# Patient Record
Sex: MALE | Race: WHITE | NOT HISPANIC OR LATINO | Employment: STUDENT | URBAN - METROPOLITAN AREA
[De-identification: names, ages, dates, MRNs, and addresses within clinical notes are randomized per-mention and may not be internally consistent; named-entity substitution may affect disease eponyms.]

---
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
## 2024-02-26 NOTE — PROGRESS NOTES
Patient ID: Negro Pfeiffer is a 18 y.o. male who presents for the evaluation of ***. They present as a referral from ***.  Patient is accompanied to the visit today by ***.    PROVIDER IMPRESSIONS:  DIAGNOSES/PROBLEMS:  -    ASSESSMENT:   Negro Pfeiffer is a pleasant 18 y.o. male who presents with symptoms of ***.   Based on the clinical information provided, symptoms and clinical exam findings are consistent with ***.   Reassurance provided to patient that otologic exam today revealed no evidence of acute infection/inflammation in the external auditory canal (EAC) bilaterally and that tympanic membrane (TM) appears with no evidence of infection, effusion, retraction or perforation bilaterally.  Audiogram reviewed in detail with the patient, which revealed ***.     PLAN:  ***  ***  Follow-up: Patient may schedule for follow-up in {Time; 1 week to 1 year:83124}. They may follow-up sooner, if needed. Patient is agreeable to this plan, all questions were answered to patient's satisfaction.     Subjective   HPI: Negro Pfeiffer is a 18 y.o. male who presents for the evaluation of ***.  The patient states that symptom onset began ***.   When asked about the presence of hearing loss, ear pain, ear fullness/pressure, tinnitus, ear itching, ear drainage, autophony, dizziness or vertigo, he admits to ***.   When asked about pertinent otologic history, the patient {DENIES/REPORTS EC:97818} a history of recurrent ear infections, {DENIES/REPORTS EC:91135} history of ear surgery, {DENIES/REPORTS EC:89655} history of PE tube insertion, and {DENIES/REPORTS EC:63237} history of prolonged/traumatic loud noise exposure.   The patient {DOES_DOES NOT:78688} insert Q-tips in the ear canals, and  {DENIES/REPORTS EC:84161} insertion of other foreign objects into the ear canals. The patient {DENIES/REPORTS EC:96303} history of wearing hearing aid devices.   The patient {DOES_DOES NOT:38350} endorse a family history of hearing loss.       PATIENT  HISTORY:  No past medical history on file.   No past surgical history on file.   No Known Allergies     Current Outpatient Medications:     predniSONE (Deltasone) 20 mg tablet, Take 3 tabs (60mg) daily for 3 days, then take 2 tabs (40mg) daily for 3 days, then take 1 tab (20mg) daily for 3 days., Disp: 18 tablet, Rfl: 0   Tobacco Use: Low Risk  (11/7/2023)    Patient History     Smoking Tobacco Use: Never     Smokeless Tobacco Use: Never     Passive Exposure: Not on file      Alcohol Use: Not on file      Social History     Substance and Sexual Activity   Drug Use Not Currently        Review of Systems   All other systems negative.     Objective   Visit Vitals  Smoking Status Never        PHYSICAL EXAM:  General appearance: Appears well, well-nourished, well groomed. No acute distress.   Constitutional: No fever, chills, weight loss or weight gain.  Communication: Normal communication  Psychiatric: Oriented to person, place and time. Normal mood and affect.  Neurologic: Cranial nerves II-XII grossly intact and symmetric bilaterally.  Cardiovascular: Examination of peripheral vascular system shows no clubbing or cyanosis.  Respiratory: No respiratory distress increased work of breathing. Inspection of the chest with symmetric chest expansion and normal respiratory effort.  Skin: No head and neck rashes.  Head: Normocephalic. Atraumatic with no masses, lesions or scarring.  Face: Normal symmetry. No scars or deformities.  Eyes: Conjunctiva not edematous or erythematous. PERRLA  Neck: Supple and symmetric, trachea midline. Lymph nodes with no adenopathy.  Head: Normocephalic. Atraumatic with no masses, lesions or scarring.  Eyes: PERRL, EOMI, Conjunctiva is clear. No nystagmus.  Nose: External inspection of nose: No nasal lesions, lacerations or scars. No tenderness on frontal or maxillary sinus palpation.  Anterior rhinoscopy with limited visualization past the inferior turbinates: Septum is [].  No septal perforation  or lesions. No septal hematoma/ seroma.  No signs of bleeding.  No evidence of intranasal polyps.  Inferior turbinates are [].    Throat:  Floor of mouth is clear, no masses.  Tongue appears normal, no lesions or masses. Gums, gingiva, buccal mucosa appear pink and moist, no lesions. Teeth are in intact.  No obvious dental infections.  Peritonsillar regions appear symmetric without swelling.  Hard and soft palate appear normal, no obvious cleft. Uvula is midline.  Left Tonsil -- [], no exudates.  Right Tonsil -- [], no exudates.  Oropharynx: No lesions. Retropharyngeal wall is flat.  []postnasal drip.  Salivary Glands: Symmetric bilaterally.  No palpable masses.  No evidence of acute infection or salivary stones.  TMJ: Normal, no trismus.  Right Ear: External inspection of ear with no deformity, scars, or masses. Mastoid is nontender.   External auditory canal is clear.    TM is intact with no sign of infection, effusion, or retraction.  No perforation seen.   Auto insufflation visible under microscopy.  Left Ear: External inspection of ear with no deformity, scars, or masses. Mastoid is nontender.   External auditory canal is clear.    TM is intact with no sign of infection, effusion, or retraction.  No perforation seen.   Auto insufflation visible under microscopy.    RESULTS:  I personally reviewed the patient's audiogram today from ***, which showed: Normal hearing across all frequencies in bilateral ears. Normal tympanogram bilaterally. Excellent word discrimination bilaterally. Acoustic reflexes present right ipsilateral, and left ipsilateral.    Procedures     Dixie Nolasco, APRN-CNP

## 2024-02-27 ENCOUNTER — APPOINTMENT (OUTPATIENT)
Dept: OTOLARYNGOLOGY | Facility: CLINIC | Age: 19
End: 2024-02-27
Payer: COMMERCIAL